# Patient Record
Sex: MALE | Race: WHITE | NOT HISPANIC OR LATINO | URBAN - METROPOLITAN AREA
[De-identification: names, ages, dates, MRNs, and addresses within clinical notes are randomized per-mention and may not be internally consistent; named-entity substitution may affect disease eponyms.]

---

## 2017-03-03 ENCOUNTER — GENERIC CONVERSION - ENCOUNTER (OUTPATIENT)
Dept: OTHER | Facility: OTHER | Age: 37
End: 2017-03-03

## 2018-01-17 NOTE — MISCELLANEOUS
Provider Comments  Patient a no show for NP establish visit   C Keen        Signatures   Electronically signed by : Jamil Ramachandran; Mar 13 2017  7:45AM EST                       (Author)